# Patient Record
Sex: FEMALE | Race: WHITE | NOT HISPANIC OR LATINO | Employment: STUDENT | URBAN - METROPOLITAN AREA
[De-identification: names, ages, dates, MRNs, and addresses within clinical notes are randomized per-mention and may not be internally consistent; named-entity substitution may affect disease eponyms.]

---

## 2017-01-30 DIAGNOSIS — Z30.011 OCP (ORAL CONTRACEPTIVE PILLS) INITIATION: ICD-10-CM

## 2017-01-31 RX ORDER — NORGESTIMATE AND ETHINYL ESTRADIOL 0.25-0.035
KIT ORAL
Qty: 90 TABLET | Refills: 1 | Status: SHIPPED | OUTPATIENT
Start: 2017-01-31 | End: 2017-04-13 | Stop reason: SDUPTHER

## 2017-04-13 DIAGNOSIS — Z30.011 OCP (ORAL CONTRACEPTIVE PILLS) INITIATION: ICD-10-CM

## 2017-04-13 RX ORDER — NORGESTIMATE AND ETHINYL ESTRADIOL 0.25-0.035
1 KIT ORAL DAILY
Qty: 90 TABLET | Refills: 0 | Status: SHIPPED | OUTPATIENT
Start: 2017-04-13 | End: 2017-05-10 | Stop reason: SDUPTHER

## 2017-04-13 NOTE — TELEPHONE ENCOUNTER
----- Message from Keesha Anderson sent at 4/13/2017  2:49 PM CDT -----  Contact: Self  REFILL: SPRINTEC, 28, 0.25-35 mg-mcg per tablet  -----------------------------------------  MESSAGE LEFT FOR PT : INFORMED PT REFILL SET UP FOR HER SPRINTEC  BIRTH CONTROL SENT TO DR MEDELLIN, TO FILL  AND IT SHOULD BE AT HER PHARMACY BY THE MORNING

## 2017-05-10 ENCOUNTER — OFFICE VISIT (OUTPATIENT)
Dept: OBSTETRICS AND GYNECOLOGY | Facility: CLINIC | Age: 22
End: 2017-05-10
Payer: COMMERCIAL

## 2017-05-10 VITALS
DIASTOLIC BLOOD PRESSURE: 84 MMHG | WEIGHT: 199.31 LBS | BODY MASS INDEX: 33.21 KG/M2 | HEIGHT: 65 IN | SYSTOLIC BLOOD PRESSURE: 122 MMHG

## 2017-05-10 DIAGNOSIS — Z01.419 WELL WOMAN EXAM WITH ROUTINE GYNECOLOGICAL EXAM: Primary | ICD-10-CM

## 2017-05-10 DIAGNOSIS — Z11.3 SCREEN FOR STD (SEXUALLY TRANSMITTED DISEASE): ICD-10-CM

## 2017-05-10 DIAGNOSIS — Z30.41 FAMILY PLANNING, BCP (BIRTH CONTROL PILLS) MAINTENANCE: ICD-10-CM

## 2017-05-10 DIAGNOSIS — A74.9 CHLAMYDIA: Primary | ICD-10-CM

## 2017-05-10 DIAGNOSIS — A60.09 GENITAL HERPES IN WOMEN: ICD-10-CM

## 2017-05-10 LAB
C TRACH DNA SPEC QL NAA+PROBE: DETECTED
N GONORRHOEA DNA SPEC QL NAA+PROBE: NOT DETECTED

## 2017-05-10 PROCEDURE — 99395 PREV VISIT EST AGE 18-39: CPT | Mod: S$GLB,,, | Performed by: OBSTETRICS & GYNECOLOGY

## 2017-05-10 PROCEDURE — 87591 N.GONORRHOEAE DNA AMP PROB: CPT

## 2017-05-10 PROCEDURE — 99999 PR PBB SHADOW E&M-EST. PATIENT-LVL II: CPT | Mod: PBBFAC,,, | Performed by: OBSTETRICS & GYNECOLOGY

## 2017-05-10 RX ORDER — VALACYCLOVIR HYDROCHLORIDE 500 MG/1
500 TABLET, FILM COATED ORAL 3 TIMES DAILY PRN
Qty: 30 TABLET | Refills: 1 | Status: SHIPPED | OUTPATIENT
Start: 2017-05-10 | End: 2021-08-11 | Stop reason: SDUPTHER

## 2017-05-10 RX ORDER — NORGESTIMATE AND ETHINYL ESTRADIOL 0.25-0.035
1 KIT ORAL DAILY
Qty: 90 TABLET | Refills: 3 | Status: SHIPPED | OUTPATIENT
Start: 2017-05-10 | End: 2018-03-23 | Stop reason: SDUPTHER

## 2017-05-10 RX ORDER — AZITHROMYCIN 500 MG/1
1000 TABLET, FILM COATED ORAL ONCE
Qty: 2 TABLET | Refills: 1 | Status: SHIPPED | OUTPATIENT
Start: 2017-05-10 | End: 2017-05-10

## 2017-05-10 NOTE — PROGRESS NOTES
"[facility]  Ambulatory Clinic  Obstetrics & Gynecology    Visit Date:  5/10/2017    Chief Complaint:  Annual GYN exam, vaginal blister, refill OCP    History of Present Illness:      Martha Espinosa is a 21 y.o. G0 here for a gynecologic exam with c/o vaginal blister.      Pt states she contracted herpes from her boy friend.  This is her first episode, lasted 7 days, painful.    Periods are regular, not heavy or painful.    Pt current method of family planning is OCP and pt is requesting a refill.    Pt has never had a pap.    Pt performs monthly self breast examination, non-smoker, uses seat belts, and denies abuse.     Pt denies any abnormal vaginal bleeding, vaginal discharge, dysmenorrhea, dyspareunia, pelvic pain, bloating, early satiety, unintentional weight loss, breast mass/skin changes, GI or urinary complaints.      Past History:  Gynecologic history as noted above.    Review of Systems:      GENERAL:  No fever, fatigue, excessive weight gain or loss  HEENT:  No headaches, hearing changes, visual disturbance  RESPIRATORY:  No cough, shortness of breath  CARDIOVASCULAR:  No chest pain, heart palpitations, leg swelling  BREAST:  No lump, pain, nipple discharge, skin changes  GASTROINTESTINAL:  No nausea, vomiting, constipation, diarrhea, abd pain, rectal bleeding   GENITOURINARY:  See HPI  ENDOCRINE:  No heat or cold intolerance  HEMATOLOGIC:  No easy bruisability or bleeding   LYMPHATICS:  No enlarged nodes  MUSCULOSKELETAL:  No joint pain or swelling  SKIN:  No rash, lesions, jaundice  NEUROLOGIC:  No dizziness, weakness, syncope  PSYCHIATRIC:  No depression, homicidal/suicidal ideations, anxiety or mood swings    Physical Exam:     /84 (BP Location: Right arm, Patient Position: Sitting, BP Method: Manual)  Ht 5' 5" (1.651 m)  Wt 90.4 kg (199 lb 4.7 oz)  LMP 04/26/2017 (Exact Date)  BMI 33.16 kg/m2  Pulse 60, Resp rate 16  Patient's last menstrual period was 04/26/2017 " "(exact date).     GENERAL:  No acute distress, well-nourished  HEENT:  Atraumatic, anicteric, moist mucus membranes, neck supple w/o masses.  BREAST:  Pt declined, not clinically indicated.  LUNGS:  Clear to auscultation  HEART:  Regular rate and rhythm, no murmurs, gallops, or rubs  ABDOMEN:  Soft, non-tender, non-distended, normoactive bowel sounds, no obvious organomegaly  EXT:  Symmetric w/o cramping, claudication, or edema. +2 distal pulses.  SKIN:  No rashes or bruising  PSYCH:  Mood and affect appropriate  VULVAR:  Female external genitalia with scatted small blister, painful, smooth borders. Normal urethral meatus. No gross lymphadenopathy. Pt declined speculum or bimanual exam.    Chaperone present for exam.    Assessment:     21 y.o. G0:    1. Well woman gynecologic exam  2. Genital herpes  3. Family planning    Plan:    A gynecologic health assessment was performed with age appropriate counseling.    Cervical cancer screening - no pap until 23.    STI screening - pt requested gonorrhea & chlamydia testing.  Pt declined other STI testing including HSV and HIV.  Safe sex discussed.      We also discussed her genital herpes.  Pt declined daily suppressive therapy and will take Valtrex prn with outbreaks.  Inform partner(s) to seek testing and treatment.  Encourage condoms use and avoid sexual contact with prodromal symptoms or active lesions.  Pt was informed that it is possible to spread herpes even without any symptoms or while on suppressive therapy.  CDC factsheet on genital herpes given to pt.  Pt declined HSV testing due to "cost".    Refill OCP.  Risks, benefits, and alternatives to OCP reviewed.    Encourage healthy lifestyle modifications, monthly self breast exams, encourage HPV vaccination, Ca/Vit D.    F/u with PCP for health maintenance.    Return 1 year for gynecologic exam, or sooner as needed.      Pt voiced understanding.        Stanley Shell MD        "

## 2017-05-10 NOTE — MR AVS SNAPSHOT
"    [facility]  [address] [facility] [address]  [place] [address]  Phone: [phone]                  Martha Espinosa   5/10/2017 8:30 AM   Office Visit    Description:  Female : 1995   Provider:  Stanley Shell MD   Department:  [facility] - [facility]           Reason for Visit     Contraception                To Do List           Goals (5 Years of Data)     None      [facility]     [facility] Care Line -  Assistance  Unless otherwise directed by your provider, please contact [facility], our nurse care line that is available for  assistance.     Registered nurses in the [facility] provide: appointment scheduling, clinical advisement, health education, and other advisory services.  Call: [phone] (toll free)               Medications           Message regarding Medications     Verify the changes and/or additions to your medication regime listed below are the same as discussed with your clinician today.  If any of these changes or additions are incorrect, please notify your healthcare provider.             Verify that the below list of medications is an accurate representation of the medications you are currently taking.  If none reported, the list may be blank. If incorrect, please contact your healthcare provider. Carry this list with you in case of emergency.           Current Medications     norgestimate-ethinyl estradiol (SPRINTEC, 28,) 0.25-35 mg-mcg per tablet Take 1 tablet by mouth once daily.           Clinical Reference Information           Your Vitals Were     BP Height Weight Last Period BMI    122/84 (BP Location: Right arm, Patient Position: Sitting, BP Method: Manual) 5' 5" (1.651 m) 90.4 kg (199 lb 4.7 oz) 2017 (Exact Date) 33.16 kg/m2      Blood Pressure          Most Recent Value    BP  122/84      Allergies as of 5/10/2017     No Known Allergies      Immunizations Administered on Date of Encounter - 5/10/2017     None    "   [facility] Sign-Up     Activating your [facility] account is as easy as 1-2-3!     1) Visit [email or web address], select Sign Up Now, enter this activation code and your date of birth, then select Next.  [email or web address]  Expires: 6/24/2017  8:50 AM      2) Create a username and password to use when you visit [facility] in the future and select a security question in case you lose your password and select Next.    3) Enter your e-mail address and click Sign Up!    Additional Information  If you have questions, please e-mail [email or web address] or call [phone] to talk to our [facility] staff. Remember, [facility] is NOT to be used for urgent needs. For medical emergencies, dial 911.         Language Assistance Services     ATTENTION: Language assistance services are available, free of charge. Please call [phone].      ATENCIÓN: Si habla elia, tiene a thomason disposición servicios gratuitos de asistencia lingüística. Llame al [phone].     CHÚ Ý: N?u b?n nói Ti?ng Vi?t, có các d?ch v? h? tr? ngôn ng? mi?n phí dành cho b?n. G?i s? [phone].         [facility] complies with applicable Federal civil rights laws and does not discriminate on the basis of race, color, national origin, age, disability, or sex.

## 2017-06-15 ENCOUNTER — OFFICE VISIT (OUTPATIENT)
Dept: OBSTETRICS AND GYNECOLOGY | Facility: CLINIC | Age: 22
End: 2017-06-15
Payer: COMMERCIAL

## 2017-06-15 VITALS
BODY MASS INDEX: 33.24 KG/M2 | HEIGHT: 65 IN | DIASTOLIC BLOOD PRESSURE: 80 MMHG | SYSTOLIC BLOOD PRESSURE: 124 MMHG | WEIGHT: 199.5 LBS

## 2017-06-15 DIAGNOSIS — Z11.3 SCREEN FOR STD (SEXUALLY TRANSMITTED DISEASE): Primary | ICD-10-CM

## 2017-06-15 PROCEDURE — 99213 OFFICE O/P EST LOW 20 MIN: CPT | Mod: S$GLB,,, | Performed by: OBSTETRICS & GYNECOLOGY

## 2017-06-15 PROCEDURE — 99999 PR PBB SHADOW E&M-EST. PATIENT-LVL II: CPT | Mod: PBBFAC,,, | Performed by: OBSTETRICS & GYNECOLOGY

## 2017-06-15 NOTE — PROGRESS NOTES
"[facility]  Ambulatory Clinic  Obstetrics & Gynecology    Visit Date:  6/15/2017    Chief Complaint:  Gonorrhea/chlamydia test of cure    History of Present Illness:      Martha Espinosa is a 21 y.o. G0 here for gonorrhea/chlamydia test of cure.    Pt was treated for chlamydia since her last visit.    Pt first herpes episode resolved.    Pt denies any abnormal vaginal bleeding, vaginal discharge, dysmenorrhea, dyspareunia, pelvic pain, bloating, early satiety, unintentional weight loss, breast mass/skin changes, GI or urinary complaints.      Review of Systems:      GENERAL:  No fever, fatigue, excessive weight gain or loss  HEENT:  No headaches, hearing changes, visual disturbance  RESPIRATORY:  No cough, shortness of breath  CARDIOVASCULAR:  No chest pain, heart palpitations, leg swelling  BREAST:  No lump, pain, nipple discharge, skin changes  GASTROINTESTINAL:  No nausea, vomiting, constipation, diarrhea, abd pain, rectal bleeding   GENITOURINARY:  See HPI    Physical Exam:     /80 (BP Location: Left arm, Patient Position: Sitting, BP Method: Manual)   Ht 5' 5" (1.651 m)   Wt 90.5 kg (199 lb 8.3 oz)   LMP 06/02/2017 (Exact Date)   BMI 33.20 kg/m²      GENERAL:  No acute distress, well-nourished  HEENT:  Atraumatic, anicteric, moist mucus membranes, neck supple w/o masses.  BREAST:  Pt declined, not clinically indicated.  LUNGS:  Clear to auscultation  HEART:  Regular rate and rhythm, no murmurs, gallops, or rubs  ABDOMEN:  Soft, non-tender, non-distended  EXT:  Symmetric w/o cramping, claudication, or edema. +2 distal pulses.  PELVIC:  Pt declined.    Chaperone present for exam.    Assessment:     21 y.o. G0:    1. Gonorrhea/chlamydia test of cure    Plan:    Repeat gonorrhea/chlamydia.  Pt will complete testing a [facility].  Rx given to pt.    Pt declined other STI testing.  Safe sex discussed.      Return as needed.      Pt voiced understanding.        Stanley Shell, " MD

## 2018-03-23 DIAGNOSIS — Z30.41 FAMILY PLANNING, BCP (BIRTH CONTROL PILLS) MAINTENANCE: ICD-10-CM

## 2018-03-23 RX ORDER — NORGESTIMATE AND ETHINYL ESTRADIOL 0.25-0.035
KIT ORAL
Qty: 84 TABLET | Refills: 0 | Status: SHIPPED | OUTPATIENT
Start: 2018-03-23 | End: 2018-08-28 | Stop reason: SDUPTHER

## 2018-08-28 DIAGNOSIS — Z30.41 FAMILY PLANNING, BCP (BIRTH CONTROL PILLS) MAINTENANCE: ICD-10-CM

## 2018-08-28 RX ORDER — NORGESTIMATE AND ETHINYL ESTRADIOL 0.25-0.035
KIT ORAL
Qty: 84 TABLET | Refills: 0 | Status: SHIPPED | OUTPATIENT
Start: 2018-08-28 | End: 2018-11-21 | Stop reason: SDUPTHER

## 2018-11-21 DIAGNOSIS — Z30.41 FAMILY PLANNING, BCP (BIRTH CONTROL PILLS) MAINTENANCE: ICD-10-CM

## 2018-11-21 RX ORDER — NORGESTIMATE AND ETHINYL ESTRADIOL 0.25-0.035
KIT ORAL
Qty: 84 TABLET | Refills: 0 | Status: SHIPPED | OUTPATIENT
Start: 2018-11-21 | End: 2019-06-03 | Stop reason: SDUPTHER

## 2019-06-03 ENCOUNTER — OFFICE VISIT (OUTPATIENT)
Dept: OBSTETRICS AND GYNECOLOGY | Facility: CLINIC | Age: 24
End: 2019-06-03
Payer: COMMERCIAL

## 2019-06-03 VITALS
WEIGHT: 210.31 LBS | HEIGHT: 65 IN | SYSTOLIC BLOOD PRESSURE: 138 MMHG | BODY MASS INDEX: 35.04 KG/M2 | DIASTOLIC BLOOD PRESSURE: 79 MMHG

## 2019-06-03 DIAGNOSIS — Z30.41 FAMILY PLANNING, BCP (BIRTH CONTROL PILLS) MAINTENANCE: ICD-10-CM

## 2019-06-03 DIAGNOSIS — Z11.3 SCREEN FOR STD (SEXUALLY TRANSMITTED DISEASE): ICD-10-CM

## 2019-06-03 DIAGNOSIS — Z01.419 WELL WOMAN EXAM WITH ROUTINE GYNECOLOGICAL EXAM: Primary | ICD-10-CM

## 2019-06-03 DIAGNOSIS — Z12.4 CERVICAL CANCER SCREENING: ICD-10-CM

## 2019-06-03 DIAGNOSIS — B37.31 VAGINAL YEAST INFECTION: ICD-10-CM

## 2019-06-03 PROCEDURE — 87491 CHLMYD TRACH DNA AMP PROBE: CPT

## 2019-06-03 PROCEDURE — 99395 PR PREVENTIVE VISIT,EST,18-39: ICD-10-PCS | Mod: S$GLB,,, | Performed by: OBSTETRICS & GYNECOLOGY

## 2019-06-03 PROCEDURE — 88141 LIQUID-BASED PAP SMEAR, SCREENING: ICD-10-PCS | Mod: ,,, | Performed by: PATHOLOGY

## 2019-06-03 PROCEDURE — 88141 CYTOPATH C/V INTERPRET: CPT | Mod: ,,, | Performed by: PATHOLOGY

## 2019-06-03 PROCEDURE — 87624 HPV HI-RISK TYP POOLED RSLT: CPT

## 2019-06-03 PROCEDURE — 99999 PR PBB SHADOW E&M-EST. PATIENT-LVL III: CPT | Mod: PBBFAC,,, | Performed by: OBSTETRICS & GYNECOLOGY

## 2019-06-03 PROCEDURE — 99395 PREV VISIT EST AGE 18-39: CPT | Mod: S$GLB,,, | Performed by: OBSTETRICS & GYNECOLOGY

## 2019-06-03 PROCEDURE — 88175 CYTOPATH C/V AUTO FLUID REDO: CPT | Performed by: PATHOLOGY

## 2019-06-03 PROCEDURE — 99999 PR PBB SHADOW E&M-EST. PATIENT-LVL III: ICD-10-PCS | Mod: PBBFAC,,, | Performed by: OBSTETRICS & GYNECOLOGY

## 2019-06-03 RX ORDER — FLUCONAZOLE 150 MG/1
150 TABLET ORAL
Qty: 6 TABLET | Refills: 0 | Status: SHIPPED | OUTPATIENT
Start: 2019-06-03

## 2019-06-03 RX ORDER — NORGESTIMATE AND ETHINYL ESTRADIOL 0.25-0.035
1 KIT ORAL DAILY
Qty: 84 TABLET | Refills: 3 | Status: SHIPPED | OUTPATIENT
Start: 2019-06-03

## 2019-06-03 NOTE — PROGRESS NOTES
"[facility]  Ambulatory Clinic  Obstetrics & Gynecology    Visit Date:  6/3/2019    Chief Complaint:  Annual GYN exam, refill OCP, vaginal yeast infection    History of Present Illness:      Martha Espinosa is a 23 y.o. G0 here for a gynecologic exam with with vaginal yeast infection.  Pt described discharge as itchy, white, non bloody, without pelvic pain or abnormal vaginal bleeding for past few days.  Periods are regular, not heavy or painful.  Pt current method of family planning is OCP and pt is requesting a refill.  Pt has never had a pap.  Pt reports first episode of genital herpes in herpes in the past, denies any new episode.  Pt performs monthly self breast examination, non-smoker, uses seat belts, and denies abuse.   Pt denies any abnormal vaginal bleeding, vaginal discharge, dysmenorrhea, dyspareunia, pelvic pain, bloating, early satiety, unintentional weight loss, breast mass/skin changes, GI or urinary complaints.      Past History:  Gynecologic history as noted above.    Review of Systems:    GENERAL:  No fever, fatigue, excessive weight gain or loss  HEENT:  No head injury, headaches, hearing changes, visual disturbance  RESPIRATORY:  No cough, shortness of breath  CARDIOVASCULAR:  No chest pain, heart palpitations, leg swelling  BREAST:  No lump, pain, nipple discharge, skin changes  GASTROINTESTINAL:  No nausea, vomiting, constipation, diarrhea, abd pain, rectal bleeding   URINARY:  No dysuria, frequency, hematuria  GENITOURINARY:  See HPI  ENDOCRINE:  No heat or cold intolerance  HEMATOLOGIC:  No easy bruisability or bleeding   LYMPHATICS:  No enlarged nodes  MUSCULOSKELETAL:  No joint pain or swelling  SKIN:  No rash, lesions, jaundice  NEUROLOGIC:  No dizziness, weakness, syncope  PSYCHIATRIC:  No significant mood changes, suicidal or homicidal ideations    Physical Exam:     /79 (BP Location: Right arm, Patient Position: Sitting)   Ht 5' 5" (1.651 m)   Wt 95.4 kg " (210 lb 5.1 oz)   LMP 05/21/2019   BMI 35.00 kg/m²      GENERAL:  No acute distress, well-nourished  HEENT:  Atraumatic, anicteric, moist mucus membranes, neck supple w/o masses.  BREAST:  Symmetric, non-tender, no obvious masses, no skin changes, no nipple discharge.  LUNGS:  Clear to auscultation  HEART:  Regular rate and rhythm, no murmurs, gallops, or rubs  ABDOMEN:  Soft, non-tender, non-distended, normoactive bowel sounds, no obvious organomegaly  EXT:  Symmetric w/o cramping, claudication, or edema. +2 distal pulses.  SKIN:  No rashes or bruising  PSYCH:  Mood and affect appropriate  GENITOURINARY:  NFEG no lesion. No vaginal or cervical lesion. No bleeding. Mild white discharge, +yeast. Good support. No CMT. Uterus and ovaries small, NT. Declined rectal exam. No obvious external lesions.    Chaperone present for exam.    Assessment:     23 y.o. G0:    1. Well woman gynecologic exam  2. Vaginal yeast infection  3. Family planning - OCP    Plan:    A gynecologic health assessment was performed with age appropriate counseling.  Cervical cancer screening - pap obtained today.  STI screening - pt requested gonorrhea & chlamydia testing.  Pt declined other STI testing including HSV and HIV.  Safe sex discussed.    Refill OCP.  Risks, benefits, and alternatives to OCP reviewed.  Encourage healthy lifestyle modifications, monthly self breast exams, encourage HPV vaccination, Ca/Vit D.  F/u with PCP for health maintenance.  Return 1 year for GYN exam, or sooner as needed.    Voiced understanding.        Stanley Shell MD

## 2019-06-05 LAB
C TRACH DNA SPEC QL NAA+PROBE: NOT DETECTED
N GONORRHOEA DNA SPEC QL NAA+PROBE: NOT DETECTED

## 2019-06-13 LAB
HPV HR 12 DNA CVX QL NAA+PROBE: POSITIVE
HPV16 AG SPEC QL: NEGATIVE
HPV18 DNA SPEC QL NAA+PROBE: NEGATIVE

## 2021-06-06 ENCOUNTER — HOSPITAL ENCOUNTER (EMERGENCY)
Facility: HOSPITAL | Age: 26
Discharge: HOME | End: 2021-06-06
Attending: EMERGENCY MEDICINE
Payer: MEDICAID

## 2021-06-06 VITALS
TEMPERATURE: 99 F | BODY MASS INDEX: 36.82 KG/M2 | HEIGHT: 65 IN | HEART RATE: 89 BPM | RESPIRATION RATE: 16 BRPM | OXYGEN SATURATION: 97 % | DIASTOLIC BLOOD PRESSURE: 74 MMHG | WEIGHT: 221 LBS | SYSTOLIC BLOOD PRESSURE: 121 MMHG

## 2021-06-06 DIAGNOSIS — M47.816 ARTHRITIS OF LUMBAR SPINE: Primary | ICD-10-CM

## 2021-06-06 DIAGNOSIS — R00.0 TACHYCARDIA: ICD-10-CM

## 2021-06-06 DIAGNOSIS — M54.32 SCIATICA OF LEFT SIDE: ICD-10-CM

## 2021-06-06 LAB
B-HCG UR QL: NEGATIVE
BACTERIA #/AREA URNS HPF: NORMAL /HPF
BILIRUB UR QL STRIP: NEGATIVE
CLARITY UR: ABNORMAL
COLOR UR: YELLOW
CTP QC/QA: YES
GLUCOSE UR QL STRIP: NEGATIVE
HGB UR QL STRIP: ABNORMAL
HYALINE CASTS #/AREA URNS LPF: 1 /LPF
KETONES UR QL STRIP: NEGATIVE
LEUKOCYTE ESTERASE UR QL STRIP: NEGATIVE
MICROSCOPIC COMMENT: NORMAL
NITRITE UR QL STRIP: NEGATIVE
PH UR STRIP: 6 [PH] (ref 5–8)
PROT UR QL STRIP: ABNORMAL
RBC #/AREA URNS HPF: 1 /HPF (ref 0–4)
SP GR UR STRIP: 1.03 (ref 1–1.03)
SQUAMOUS #/AREA URNS HPF: 16 /HPF
URN SPEC COLLECT METH UR: ABNORMAL
UROBILINOGEN UR STRIP-ACNC: NEGATIVE EU/DL
WBC #/AREA URNS HPF: 2 /HPF (ref 0–5)

## 2021-06-06 PROCEDURE — 81025 URINE PREGNANCY TEST: CPT | Performed by: PHYSICIAN ASSISTANT

## 2021-06-06 PROCEDURE — 99284 EMERGENCY DEPT VISIT MOD MDM: CPT | Mod: 25

## 2021-06-06 PROCEDURE — 96374 THER/PROPH/DIAG INJ IV PUSH: CPT

## 2021-06-06 PROCEDURE — 96361 HYDRATE IV INFUSION ADD-ON: CPT

## 2021-06-06 PROCEDURE — 81000 URINALYSIS NONAUTO W/SCOPE: CPT | Performed by: PHYSICIAN ASSISTANT

## 2021-06-06 PROCEDURE — 96375 TX/PRO/DX INJ NEW DRUG ADDON: CPT

## 2021-06-06 PROCEDURE — 25000003 PHARM REV CODE 250: Performed by: PHYSICIAN ASSISTANT

## 2021-06-06 PROCEDURE — 63600175 PHARM REV CODE 636 W HCPCS: Performed by: PHYSICIAN ASSISTANT

## 2021-06-06 RX ORDER — CYCLOBENZAPRINE HCL 10 MG
10 TABLET ORAL
Status: COMPLETED | OUTPATIENT
Start: 2021-06-06 | End: 2021-06-06

## 2021-06-06 RX ORDER — OXYCODONE AND ACETAMINOPHEN 5; 325 MG/1; MG/1
1 TABLET ORAL EVERY 6 HOURS PRN
Qty: 12 TABLET | Refills: 0 | Status: SHIPPED | OUTPATIENT
Start: 2021-06-06 | End: 2021-06-09

## 2021-06-06 RX ORDER — KETOROLAC TROMETHAMINE 30 MG/ML
30 INJECTION, SOLUTION INTRAMUSCULAR; INTRAVENOUS
Status: COMPLETED | OUTPATIENT
Start: 2021-06-06 | End: 2021-06-06

## 2021-06-06 RX ORDER — LIDOCAINE 50 MG/G
1 PATCH TOPICAL DAILY
Qty: 15 PATCH | Refills: 0 | Status: SHIPPED | OUTPATIENT
Start: 2021-06-06 | End: 2021-06-21

## 2021-06-06 RX ORDER — IBUPROFEN 600 MG/1
600 TABLET ORAL EVERY 6 HOURS PRN
Qty: 20 TABLET | Refills: 0 | Status: SHIPPED | OUTPATIENT
Start: 2021-06-06 | End: 2021-06-11

## 2021-06-06 RX ORDER — MORPHINE SULFATE 4 MG/ML
4 INJECTION, SOLUTION INTRAMUSCULAR; INTRAVENOUS
Status: COMPLETED | OUTPATIENT
Start: 2021-06-06 | End: 2021-06-06

## 2021-06-06 RX ORDER — CYCLOBENZAPRINE HCL 10 MG
10 TABLET ORAL 3 TIMES DAILY PRN
Qty: 20 TABLET | Refills: 0 | Status: SHIPPED | OUTPATIENT
Start: 2021-06-06 | End: 2021-06-13

## 2021-06-06 RX ADMIN — CYCLOBENZAPRINE 10 MG: 10 TABLET, FILM COATED ORAL at 09:06

## 2021-06-06 RX ADMIN — KETOROLAC TROMETHAMINE 30 MG: 30 INJECTION, SOLUTION INTRAMUSCULAR; INTRAVENOUS at 09:06

## 2021-06-06 RX ADMIN — SODIUM CHLORIDE 1000 ML: 0.9 INJECTION, SOLUTION INTRAVENOUS at 09:06

## 2021-06-06 RX ADMIN — MORPHINE SULFATE 4 MG: 4 INJECTION, SOLUTION INTRAMUSCULAR; INTRAVENOUS at 09:06

## 2021-06-07 ENCOUNTER — TELEPHONE (OUTPATIENT)
Dept: FAMILY MEDICINE | Facility: CLINIC | Age: 26
End: 2021-06-07